# Patient Record
Sex: FEMALE | Race: WHITE | NOT HISPANIC OR LATINO | URBAN - METROPOLITAN AREA
[De-identification: names, ages, dates, MRNs, and addresses within clinical notes are randomized per-mention and may not be internally consistent; named-entity substitution may affect disease eponyms.]

---

## 2017-12-07 ENCOUNTER — EMERGENCY (EMERGENCY)
Facility: HOSPITAL | Age: 62
LOS: 1 days | Discharge: HOME | End: 2017-12-07
Attending: EMERGENCY MEDICINE | Admitting: EMERGENCY MEDICINE
Payer: COMMERCIAL

## 2017-12-07 VITALS
SYSTOLIC BLOOD PRESSURE: 160 MMHG | DIASTOLIC BLOOD PRESSURE: 89 MMHG | TEMPERATURE: 98 F | RESPIRATION RATE: 18 BRPM | OXYGEN SATURATION: 100 % | HEART RATE: 100 BPM

## 2017-12-07 DIAGNOSIS — T78.3XXA ANGIONEUROTIC EDEMA, INITIAL ENCOUNTER: ICD-10-CM

## 2017-12-07 LAB
ALBUMIN SERPL ELPH-MCNC: 4.5 G/DL — SIGNIFICANT CHANGE UP (ref 3.3–5)
ALP SERPL-CCNC: 86 U/L — SIGNIFICANT CHANGE UP (ref 40–120)
ALT FLD-CCNC: 18 U/L — SIGNIFICANT CHANGE UP (ref 4–33)
APTT BLD: 36.5 SEC — SIGNIFICANT CHANGE UP (ref 27.5–37.4)
AST SERPL-CCNC: 21 U/L — SIGNIFICANT CHANGE UP (ref 4–32)
BASE EXCESS BLDV CALC-SCNC: 1.3 MMOL/L — SIGNIFICANT CHANGE UP
BASOPHILS # BLD AUTO: 0.07 K/UL — SIGNIFICANT CHANGE UP (ref 0–0.2)
BASOPHILS NFR BLD AUTO: 0.6 % — SIGNIFICANT CHANGE UP (ref 0–2)
BILIRUB SERPL-MCNC: 0.4 MG/DL — SIGNIFICANT CHANGE UP (ref 0.2–1.2)
BLOOD GAS VENOUS - CREATININE: 0.83 MG/DL — SIGNIFICANT CHANGE UP (ref 0.5–1.3)
BUN SERPL-MCNC: 17 MG/DL — SIGNIFICANT CHANGE UP (ref 7–23)
CALCIUM SERPL-MCNC: 9.5 MG/DL — SIGNIFICANT CHANGE UP (ref 8.4–10.5)
CHLORIDE BLDV-SCNC: 107 MMOL/L — SIGNIFICANT CHANGE UP (ref 96–108)
CHLORIDE SERPL-SCNC: 105 MMOL/L — SIGNIFICANT CHANGE UP (ref 98–107)
CO2 SERPL-SCNC: 25 MMOL/L — SIGNIFICANT CHANGE UP (ref 22–31)
CREAT SERPL-MCNC: 1.06 MG/DL — SIGNIFICANT CHANGE UP (ref 0.5–1.3)
EOSINOPHIL # BLD AUTO: 0.09 K/UL — SIGNIFICANT CHANGE UP (ref 0–0.5)
EOSINOPHIL NFR BLD AUTO: 0.8 % — SIGNIFICANT CHANGE UP (ref 0–6)
GAS PNL BLDV: 140 MMOL/L — SIGNIFICANT CHANGE UP (ref 136–146)
GLUCOSE BLDV-MCNC: 114 — HIGH (ref 70–99)
GLUCOSE SERPL-MCNC: 113 MG/DL — HIGH (ref 70–99)
HBA1C BLD-MCNC: 6.3 % — HIGH (ref 4–5.6)
HCO3 BLDV-SCNC: 24 MMOL/L — SIGNIFICANT CHANGE UP (ref 20–27)
HCT VFR BLD CALC: 45.8 % — HIGH (ref 34.5–45)
HCT VFR BLDV CALC: 46.2 % — HIGH (ref 34.5–45)
HGB BLD-MCNC: 14.7 G/DL — SIGNIFICANT CHANGE UP (ref 11.5–15.5)
HGB BLDV-MCNC: 15.1 G/DL — SIGNIFICANT CHANGE UP (ref 11.5–15.5)
IMM GRANULOCYTES # BLD AUTO: 0.03 # — SIGNIFICANT CHANGE UP
IMM GRANULOCYTES NFR BLD AUTO: 0.3 % — SIGNIFICANT CHANGE UP (ref 0–1.5)
INR BLD: 0.91 — SIGNIFICANT CHANGE UP (ref 0.88–1.17)
LACTATE BLDV-MCNC: 1.5 MMOL/L — SIGNIFICANT CHANGE UP (ref 0.5–2)
LYMPHOCYTES # BLD AUTO: 19.8 % — SIGNIFICANT CHANGE UP (ref 13–44)
LYMPHOCYTES # BLD AUTO: 2.23 K/UL — SIGNIFICANT CHANGE UP (ref 1–3.3)
MCHC RBC-ENTMCNC: 29.9 PG — SIGNIFICANT CHANGE UP (ref 27–34)
MCHC RBC-ENTMCNC: 32.1 % — SIGNIFICANT CHANGE UP (ref 32–36)
MCV RBC AUTO: 93.3 FL — SIGNIFICANT CHANGE UP (ref 80–100)
MONOCYTES # BLD AUTO: 0.76 K/UL — SIGNIFICANT CHANGE UP (ref 0–0.9)
MONOCYTES NFR BLD AUTO: 6.7 % — SIGNIFICANT CHANGE UP (ref 2–14)
NEUTROPHILS # BLD AUTO: 8.1 K/UL — HIGH (ref 1.8–7.4)
NEUTROPHILS NFR BLD AUTO: 71.8 % — SIGNIFICANT CHANGE UP (ref 43–77)
NRBC # FLD: 0 — SIGNIFICANT CHANGE UP
PCO2 BLDV: 49 MMHG — SIGNIFICANT CHANGE UP (ref 41–51)
PH BLDV: 7.35 PH — SIGNIFICANT CHANGE UP (ref 7.32–7.43)
PLATELET # BLD AUTO: 287 K/UL — SIGNIFICANT CHANGE UP (ref 150–400)
PMV BLD: 9.4 FL — SIGNIFICANT CHANGE UP (ref 7–13)
PO2 BLDV: 28 MMHG — LOW (ref 35–40)
POTASSIUM BLDV-SCNC: 4.8 MMOL/L — HIGH (ref 3.4–4.5)
POTASSIUM SERPL-MCNC: 4 MMOL/L — SIGNIFICANT CHANGE UP (ref 3.5–5.3)
POTASSIUM SERPL-SCNC: 4 MMOL/L — SIGNIFICANT CHANGE UP (ref 3.5–5.3)
PROT SERPL-MCNC: 7.5 G/DL — SIGNIFICANT CHANGE UP (ref 6–8.3)
PROTHROM AB SERPL-ACNC: 10.2 SEC — SIGNIFICANT CHANGE UP (ref 9.8–13.1)
RBC # BLD: 4.91 M/UL — SIGNIFICANT CHANGE UP (ref 3.8–5.2)
RBC # FLD: 13.4 % — SIGNIFICANT CHANGE UP (ref 10.3–14.5)
SAO2 % BLDV: 46.5 % — LOW (ref 60–85)
SODIUM SERPL-SCNC: 142 MMOL/L — SIGNIFICANT CHANGE UP (ref 135–145)
WBC # BLD: 11.28 K/UL — HIGH (ref 3.8–10.5)
WBC # FLD AUTO: 11.28 K/UL — HIGH (ref 3.8–10.5)

## 2017-12-07 PROCEDURE — 99220: CPT

## 2017-12-07 PROCEDURE — 31505 DIAGNOSTIC LARYNGOSCOPY: CPT

## 2017-12-07 RX ORDER — IBUPROFEN 200 MG
400 TABLET ORAL ONCE
Qty: 0 | Refills: 0 | Status: COMPLETED | OUTPATIENT
Start: 2017-12-07 | End: 2017-12-07

## 2017-12-07 RX ORDER — FAMOTIDINE 10 MG/ML
20 INJECTION INTRAVENOUS ONCE
Qty: 0 | Refills: 0 | Status: COMPLETED | OUTPATIENT
Start: 2017-12-07 | End: 2017-12-07

## 2017-12-07 RX ORDER — DIPHENHYDRAMINE HCL 50 MG
25 CAPSULE ORAL EVERY 4 HOURS
Qty: 0 | Refills: 0 | Status: DISCONTINUED | OUTPATIENT
Start: 2017-12-07 | End: 2017-12-11

## 2017-12-07 RX ORDER — FAMOTIDINE 10 MG/ML
20 INJECTION INTRAVENOUS
Qty: 0 | Refills: 0 | Status: DISCONTINUED | OUTPATIENT
Start: 2017-12-07 | End: 2017-12-11

## 2017-12-07 RX ORDER — SODIUM CHLORIDE 9 MG/ML
1000 INJECTION INTRAMUSCULAR; INTRAVENOUS; SUBCUTANEOUS
Qty: 0 | Refills: 0 | Status: COMPLETED | OUTPATIENT
Start: 2017-12-07 | End: 2017-12-07

## 2017-12-07 RX ORDER — EPINEPHRINE 0.3 MG/.3ML
0.3 INJECTION INTRAMUSCULAR; SUBCUTANEOUS ONCE
Qty: 0 | Refills: 0 | Status: COMPLETED | OUTPATIENT
Start: 2017-12-07 | End: 2017-12-07

## 2017-12-07 RX ADMIN — FAMOTIDINE 20 MILLIGRAM(S): 10 INJECTION INTRAVENOUS at 19:11

## 2017-12-07 RX ADMIN — Medication 400 MILLIGRAM(S): at 22:16

## 2017-12-07 RX ADMIN — SODIUM CHLORIDE 100 MILLILITER(S): 9 INJECTION INTRAMUSCULAR; INTRAVENOUS; SUBCUTANEOUS at 21:16

## 2017-12-07 RX ADMIN — FAMOTIDINE 20 MILLIGRAM(S): 10 INJECTION INTRAVENOUS at 12:40

## 2017-12-07 RX ADMIN — EPINEPHRINE 0.3 MILLIGRAM(S): 0.3 INJECTION INTRAMUSCULAR; SUBCUTANEOUS at 12:40

## 2017-12-07 RX ADMIN — Medication 400 MILLIGRAM(S): at 21:16

## 2017-12-07 NOTE — ED PROVIDER NOTE - PHYSICAL EXAMINATION
Katerin att: nad, aaox3, neg change in speech,  neg lip swelling, enlarged tongue, unable to assess back of throat, rrr, s1s2, ctabl neg wheeze, abd soft ntnd, neg appreciable urticaria

## 2017-12-07 NOTE — ED CDU PROVIDER INITIAL DAY NOTE - ATTENDING CONTRIBUTION TO CARE
Kelly COREY- 61 Y/O F with h/o htn and multiple allergies p/w sudden osnet of tongue swelling and throat swellign after she rinsed her mouth with new brand of listerine, pt had difficulty speakign and went to urgent care and was sent to ED, given epipnehrine, pepcid, benadryl and steroids, feels better and now is able to speak. Pt was also scoped by ENT which showes no airway obstruction    Pt is alert, well appearing female, s1s2 normal reg, b/l clear breath sounds, abd soft, nt, nd, neuro exam aox3 cn 2-12 intact, skin warm, dry, good turgor, tongue normal in size, one small red, blood contianing vascular lesion noted at base of rt tongue, uvula midline, trachea midline, no neck swelling or redness

## 2017-12-07 NOTE — ED CDU PROVIDER INITIAL DAY NOTE - PROGRESS NOTE DETAILS
pt feel sbetter but still not 100% better, still has sub tongue swelling, will continue monitoring CDU INDU Garcia: Pt signed out to me by CDU INDU Hanna: 62yF w/pmhx HTN on ramipril for 5 years presented with tongue and neck swelling, allergic reaction, following rinsing her mouth with listerine, has never used this product before. Pt is currently in NAD, tachy 103, no difficulty breathing/talking/ or swallowing. Pt is in the CDU for steroids, benadryl, pepcid and monitoring of airway overnight. ENT evaluated this patient and will see them again in the morning. Pt took her home ramipril, asked pt to not take her morning dose as I would like to discuss continuation of this medication with the attending. CDU INDU Garcia: Pt complaining of very mild dull frontal headache, requesting motrin as tylenol does not normally help her.

## 2017-12-07 NOTE — ED PROVIDER NOTE - THROAT FINDINGS
difficult to visualize with tongue swelling - uvulula midline,. no superiof palate swelling.             (after epi - able to visualize back of throat - airway patent no pharyngeal swelling noted)

## 2017-12-07 NOTE — ED PROVIDER NOTE - ATTENDING CONTRIBUTION TO CARE
Dr. Conklin: I performed a face to face bedside interview with patient regarding history of present illness, review of symptoms and past medical history. I completed an independent physical exam.  I have discussed patient's plan of care with PA.   I agree with note as stated above, having amended the EMR as needed to reflect my findings.   This includes HISTORY OF PRESENT ILLNESS, HIV, PAST MEDICAL/SURGICAL/FAMILY/SOCIAL HISTORY, ALLERGIES AND HOME MEDICATIONS, REVIEW OF SYSTEMS, PHYSICAL EXAM, and any PROGRESS NOTES during the time I functioned as the attending physician for this patient. HPI above as by me. PE above as by me. DDX angioedema PLAN epi prn DISPO likely cdu

## 2017-12-07 NOTE — CONSULT NOTE ADULT - PROBLEM SELECTOR RECOMMENDATION 9
1. Laryngoscopy normal. Patient can be discharged after 2 hour observation in ED. No need for a re eval from ENT if patient continues to improve.  2. Follow up with Allergist  3. Discussed with ENT resident.

## 2017-12-07 NOTE — ED ADULT NURSE NOTE - OBJECTIVE STATEMENT
Pt a&ox3 c/o possible allergic reaction to Listerine. Pt unable to speak r/t tongue swelling, no drooling noted, handwriting and using phone to communicate. Pt was able to consume breakfast after using the Listerine, but shortly after breakfast pt started to get hives by neck area and increased swelling to tongue. Pt O2 sat is 100% on RA. Pt denies cp/discomfort, denies headache/dizziness. Abdomen soft, non-tender, non-distended. Skin is cool dry and intact. IVL placed, labs sent. Will continue to monitor.

## 2017-12-07 NOTE — ED PROVIDER NOTE - MEDICAL DECISION MAKING DETAILS
63 y/o female w/o tongue swelling x 1 day  -possible due to new listerine use vs less likely ace-induced angioedema  -epi IM, iv pepcid, labs  -ent consult

## 2017-12-07 NOTE — ED PROVIDER NOTE - OBJECTIVE STATEMENT
61 y/o female hx HTHN (on ramipril 5mg daily x 5 years) presents to E c/o tongue swelling x 1 day. Pt. states at 715 am took some Listerine (new extra strength which she hasn't taken in the past) roughly 20 min later started to develop tongue swelling - took 2 benadryl at 830am - went to urgent care center - was given solumedrol 125mg IM and refused epinephrine - by the time she arrived in ED - upper neck had become more swollen and developed rash to neck and chest. Pt. currently unable to speak due to tongue swelling (writing everything down) but denies any shortness of breath throat swelling or tighness difficulty swallowing. Denies chest pain, fever chills. 63 y/o female hx HTHN (on ramipril 5mg daily x 5 years) presents to E c/o tongue swelling x 1 day. Pt. states at 715 am took some Listerine (new extra strength which she hasn't taken in the past) roughly 20 min later started to develop tongue swelling - took 2 benadryl at 830am - went to urgent care center - was given solumedrol 125mg IM and refused epinephrine - by the time she arrived in ED - upper neck had become more swollen and developed rash to neck and chest. Pt. currently unable to speak due to tongue swelling (writing everything down) but denies any shortness of breath throat swelling or tighness difficulty swallowing. Denies chest pain, fever chills.  13:00 Conklin att: 62F h/o htn on ramipril p/w angioedema x hours. This morning pt gargled new Listerine, shortly thereafter noted tongue swelling, took 2 benadryl. Patient later noted lip swelling. Denies sob, wheeze, change in voice, or difficult swallowing. Reports she has had similar symptoms in 61 y/o female hx HTHN (on ramipril 5mg daily x 5 years) presents to E c/o tongue swelling x 1 day. Pt. states at 715 am took some Listerine (new extra strength which she hasn't taken in the past) roughly 20 min later started to develop tongue swelling - took 2 benadryl at 830am - went to urgent care center - was given solumedrol 125mg IM and refused epinephrine - by the time she arrived in ED - upper neck had become more swollen and developed rash to neck and chest. Pt. currently unable to speak due to tongue swelling (writing everything down) but denies any shortness of breath throat swelling or tighness difficulty swallowing. Denies chest pain, fever chills.  13:00 Conklin att: 62F h/o htn on ramipril p/w angioedema x hours. This morning pt gargled new Listerine, shortly thereafter noted tongue swelling, took 2 benadryl at 08:30am. No improvement noted. Denies sob, wheeze, change in voice, or difficult swallowing. Reports she has had similar symptoms twice before in past.

## 2017-12-07 NOTE — ED CDU PROVIDER INITIAL DAY NOTE - OBJECTIVE STATEMENT
63 y/o female hx HTHN (on ramipril 5mg daily x 5 years) presents to E c/o tongue swelling x 1 day. Pt. states at 715 am took some Listerine (new extra strength which she hasn't taken in the past) roughly 20 min later started to develop tongue swelling - took 2 benadryl at 830am - went to urgent care center - was given solumedrol 125mg IM and refused epinephrine - by the time she arrived in ED - upper neck had become more swollen and developed rash to neck and chest. Pt. currently unable to speak due to tongue swelling (writing everything down) but denies any shortness of breath throat swelling or tightness difficulty swallowing. Denies chest pain, fever chills.    CDU INDU Welch: Agree with above. 62 yr old female with hx of HTN ( on ramipril 5mg daily x 5 years) presents c/o tongue swelling since this morning. Pt reports at 715 am took some listerine ( new extra strength, which is new to her) and then about 20 mins later tongue started to swell. Pt took 2 benadryl at about 830 am, then went to urgent care- was given solumedrol 125 mg IM and sent to ED for further eval.  When pt arrived to ED, swelling worsened, and pt developed rash to chest and neck. Pt was unable to speak due to tongue swelling  Pt was given epi in ED. ENT seen pt- scope was done showing no airway obstruction. Pt in cdu, for airway observation, benadryl/ pepcid/ steriods, and ENT to follow.  Pt denies any current sob or difficulty breathing.

## 2017-12-07 NOTE — ED ADULT TRIAGE NOTE - CHIEF COMPLAINT QUOTE
Pt c/o possible allergic reaction to something this morning, pt used Listerine this morning, Pt was able to eat breakfast, shortly after breakfast pt had swelling to her tongue and tightness to throat. Pt brought back directly to [facility].

## 2017-12-07 NOTE — CONSULT NOTE ADULT - SUBJECTIVE AND OBJECTIVE BOX
62 year old female with a history of allergies to foods and products presents to the   hospital stating she was rinsing with Listorine and she then began to have tongue swelling.   Patient also takes ACE inhibitor. States she was unable to speak and it improved after getting  Epi pen. Denies any SOB, Dysphagia, Hoarseness or any other complaints.    AVSS, P02 98% on room air  HEENT:  Mouth: No FOM edema, No exudates, No Injection  tongue midline.  FOE/Scope:  No BOT edema, No Masses, Epiglottis sharp  Vocal cords mobile/patent

## 2017-12-07 NOTE — ED PROVIDER NOTE - PROGRESS NOTE DETAILS
Katerin att: Patient noted to have tongue swelling, received iv benadryl, pepcid, decadron. One to two hours later patient noted to have new left side lip swelling, neg reported sob or throat tightness. Patient written for epi x 1. One hour later patient noted to have bilateral lip swelling. Patient written for second epi. Patient noted to have persistent unchanged tongue and lip swelling w/o dyspnea. Upon further history taking, pt took atenolol this am. 16:00 ENT paged to scope upper airway, cdu dispo pending ENT scope, pt signed out to Mk attg Dr. Hale.

## 2017-12-08 VITALS
OXYGEN SATURATION: 95 % | TEMPERATURE: 98 F | HEART RATE: 91 BPM | DIASTOLIC BLOOD PRESSURE: 98 MMHG | SYSTOLIC BLOOD PRESSURE: 151 MMHG | RESPIRATION RATE: 14 BRPM

## 2017-12-08 PROCEDURE — 99217: CPT

## 2017-12-08 RX ORDER — EPINEPHRINE 0.3 MG/.3ML
0.3 INJECTION INTRAMUSCULAR; SUBCUTANEOUS
Qty: 1 | Refills: 0 | OUTPATIENT
Start: 2017-12-08

## 2017-12-08 RX ADMIN — FAMOTIDINE 20 MILLIGRAM(S): 10 INJECTION INTRAVENOUS at 06:28

## 2017-12-08 RX ADMIN — Medication 60 MILLIGRAM(S): at 08:58

## 2017-12-08 NOTE — ED CDU PROVIDER DISPOSITION NOTE - CLINICAL COURSE
Pt doing well, throat swelling markedly improved, no uvular edema, CTAB. Will d/w PMD, hold ACEi (however pt took ramipiril last night with no effects). Pt to be dc'ed home on prednisone, pepcid and benadryl and pt has allergist to f/u with. Pt stable for dc. Dr. Christensen: 62F h/o allergies p/w throat swelling and tongue swelling yesterday 45 mins s/p listerine (new formulation). Pt is also on ramipiril. Pt recd epi and steroids in the ED. Today pt is doing well, throat swelling markedly improved, no uvular edema, CTAB. Will d/w PMD, hold ACEi (however pt took ramipiril last night with no effects). Pt to be dc'ed home on prednisone, pepcid and benadryl and pt has allergist to f/u with. Pt stable for dc. Dr. Christensen: 62F h/o allergies p/w throat swelling and tongue swelling yesterday 45 mins s/p listerine (new formulation). Pt is also on ramipiril. Pt recd epi and steroids in the ED. Today pt is doing well, throat swelling markedly improved, no uvular edema, CTAB. Will d/w PMD, hold ACEi (however pt took ramipiril last night with no effects). Pt to be dc'ed home on prednisone, pepcid and benadryl and pt has allergist to f/u with. Pt stable for dc.    INDU Condon- Pt feeling better after CDU stay, swelling significantly improved, tolerating PO, no voice changes. stable for dc w/ close allergy and pmd f/u.

## 2017-12-08 NOTE — ED CDU PROVIDER SUBSEQUENT DAY NOTE - HISTORY
CDU INITIAL DAY HPI: "62 yr old female with hx of HTN ( on ramipril 5mg daily x 5 years) presents c/o tongue swelling since this morning. Pt reports at 715 am took some listerine ( new extra strength, which is new to her) and then about 20 mins later tongue started to swell. Pt took 2 benadryl at about 830 am, then went to urgent care- was given solumedrol 125 mg IM and sent to ED for further eval.  When pt arrived to ED, swelling worsened, and pt developed rash to chest and neck. Pt was unable to speak due to tongue swelling  Pt was given epi in ED. ENT seen pt- scope was done showing no airway obstruction. Pt in cdu, for airway observation, benadryl/ pepcid/ steriods, and ENT to follow.  Pt denies any current sob or difficulty breathing."    Pt signed out to me by INDU Welch: 62yF w/pmhx HTN on ramipril presented to the ED with tongue and neck swelling following using listerine for the first time. Pt was given IV steroids by EMS and epi upon arrival to the ED. ENT evaluated the patient and noted no throat swelling. Pt denies difficulty breathing, swallowing or speaking.  In CDU for monitoring and ENT to see in the AM

## 2017-12-08 NOTE — ED CDU PROVIDER SUBSEQUENT DAY NOTE - ATTENDING CONTRIBUTION TO CARE
Dr. Christensen: I performed a face to face bedside interview with patient regarding history of present illness, review of symptoms and past medical history. I completed an independent physical exam.  I have discussed patient's plan of care with PA.   I agree with note as stated above, having amended the EMR as needed to reflect my findings.   This includes HISTORY OF PRESENT ILLNESS, HIV, PAST MEDICAL/SURGICAL/FAMILY/SOCIAL HISTORY, ALLERGIES AND HOME MEDICATIONS, REVIEW OF SYSTEMS, PHYSICAL EXAM, and any PROGRESS NOTES during the time I functioned as the attending physician for this patient.  62F h/o allergies p/w throat swelling and tongue swelling yesterday 45 mins s/p listerine (new formulation). Pt is also on ramipiril. Pt recd epi and steroids in the ED. Pt doing well, throat swelling markedly improved, no uvular edema, CTAB. Will d/w PMD, hold ACEi (however pt took ramipiril last night with no effects). Pt to be dc'ed home on prednisone, pepcid and benadryl and pt has allergist to f/u with.

## 2017-12-08 NOTE — ED CDU PROVIDER DISPOSITION NOTE - ATTENDING CONTRIBUTION TO CARE
Dr. Christensen: I performed a face to face bedside interview with patient regarding history of present illness, review of symptoms and past medical history. I completed an independent physical exam.  I have discussed patient's plan of care with PA.   I agree with note as stated above, having amended the EMR as needed to reflect my findings.   This includes HISTORY OF PRESENT ILLNESS, HIV, PAST MEDICAL/SURGICAL/FAMILY/SOCIAL HISTORY, ALLERGIES AND HOME MEDICATIONS, REVIEW OF SYSTEMS, PHYSICAL EXAM, and any PROGRESS NOTES during the time I functioned as the attending physician for this patient.  Dr. Christensen: This H&P has been written by myself in its entirety

## 2017-12-08 NOTE — ED CDU PROVIDER SUBSEQUENT DAY NOTE - PROGRESS NOTE DETAILS
CDU INDU Garcia: Pt resting comfortably in bed, NAD, VSS, no complaints overnight, no longer tachycardic following 1L NS CDU INDU Garcia: Pt resting comfortably in bed, NAD, VSS, no complaints overnight, pt still feels her neck is swollen but much better than yesterday, no longer tachycardic following 1L NS Pt doing well, throat swelling markedly improved, no uvular edema, CTAB. Will d/w PMD, hold ACEi (however pt took ramipiril last night with no effects). Pt to be dc'ed home on prednisone, pepcid and benadryl and pt has allergist to f/u with. Pt feeling better after ER say, swelling significantly imiproved, tolerating PO, no voice changes. WIll dc ramapril. Pt to have close f/u with PMD. stable for dc.

## 2017-12-08 NOTE — ED CDU PROVIDER SUBSEQUENT DAY NOTE - PROGRESS NOTE
I have reviewed and confirmed nurses' notes for patient's medications, allergies, medical history, and surgical history.
Stable.

## 2017-12-08 NOTE — ED CDU PROVIDER SUBSEQUENT DAY NOTE - MEDICAL DECISION MAKING DETAILS
62yF w/pmhx HTN on ramipril for 5 years presented with allergic reaction that began 45 minutes after using Listerine for the first time. ENT evaluated pt, given IV steroids by EMS and epi in the ED. Pt is CDU for monitoring and possible re-eval by ENT.

## 2018-01-14 ENCOUNTER — EMERGENCY (EMERGENCY)
Facility: HOSPITAL | Age: 63
LOS: 1 days | Discharge: HOME | End: 2018-01-14
Attending: EMERGENCY MEDICINE | Admitting: EMERGENCY MEDICINE
Payer: COMMERCIAL

## 2018-01-14 VITALS
HEART RATE: 91 BPM | OXYGEN SATURATION: 99 % | TEMPERATURE: 98 F | DIASTOLIC BLOOD PRESSURE: 86 MMHG | RESPIRATION RATE: 18 BRPM | SYSTOLIC BLOOD PRESSURE: 151 MMHG

## 2018-01-14 VITALS
RESPIRATION RATE: 18 BRPM | TEMPERATURE: 99 F | OXYGEN SATURATION: 99 % | HEART RATE: 87 BPM | DIASTOLIC BLOOD PRESSURE: 97 MMHG | SYSTOLIC BLOOD PRESSURE: 159 MMHG

## 2018-01-14 LAB
ALBUMIN SERPL ELPH-MCNC: 4.4 G/DL — SIGNIFICANT CHANGE UP (ref 3.3–5)
ALP SERPL-CCNC: 89 U/L — SIGNIFICANT CHANGE UP (ref 40–120)
ALT FLD-CCNC: 17 U/L — SIGNIFICANT CHANGE UP (ref 4–33)
AST SERPL-CCNC: 36 U/L — HIGH (ref 4–32)
BASOPHILS # BLD AUTO: 0.1 K/UL — SIGNIFICANT CHANGE UP (ref 0–0.2)
BASOPHILS NFR BLD AUTO: 1 % — SIGNIFICANT CHANGE UP (ref 0–2)
BILIRUB SERPL-MCNC: 0.2 MG/DL — SIGNIFICANT CHANGE UP (ref 0.2–1.2)
BUN SERPL-MCNC: 20 MG/DL — SIGNIFICANT CHANGE UP (ref 7–23)
CALCIUM SERPL-MCNC: 8.9 MG/DL — SIGNIFICANT CHANGE UP (ref 8.4–10.5)
CHLORIDE SERPL-SCNC: 106 MMOL/L — SIGNIFICANT CHANGE UP (ref 98–107)
CO2 SERPL-SCNC: 21 MMOL/L — LOW (ref 22–31)
CREAT SERPL-MCNC: 0.88 MG/DL — SIGNIFICANT CHANGE UP (ref 0.5–1.3)
EOSINOPHIL # BLD AUTO: 0.13 K/UL — SIGNIFICANT CHANGE UP (ref 0–0.5)
EOSINOPHIL NFR BLD AUTO: 1.3 % — SIGNIFICANT CHANGE UP (ref 0–6)
GLUCOSE SERPL-MCNC: 100 MG/DL — HIGH (ref 70–99)
HCT VFR BLD CALC: 46.5 % — HIGH (ref 34.5–45)
HGB BLD-MCNC: 15.1 G/DL — SIGNIFICANT CHANGE UP (ref 11.5–15.5)
IMM GRANULOCYTES # BLD AUTO: 0.04 # — SIGNIFICANT CHANGE UP
IMM GRANULOCYTES NFR BLD AUTO: 0.4 % — SIGNIFICANT CHANGE UP (ref 0–1.5)
LYMPHOCYTES # BLD AUTO: 2.43 K/UL — SIGNIFICANT CHANGE UP (ref 1–3.3)
LYMPHOCYTES # BLD AUTO: 24.3 % — SIGNIFICANT CHANGE UP (ref 13–44)
MCHC RBC-ENTMCNC: 30.8 PG — SIGNIFICANT CHANGE UP (ref 27–34)
MCHC RBC-ENTMCNC: 32.5 % — SIGNIFICANT CHANGE UP (ref 32–36)
MCV RBC AUTO: 94.9 FL — SIGNIFICANT CHANGE UP (ref 80–100)
MONOCYTES # BLD AUTO: 0.73 K/UL — SIGNIFICANT CHANGE UP (ref 0–0.9)
MONOCYTES NFR BLD AUTO: 7.3 % — SIGNIFICANT CHANGE UP (ref 2–14)
NEUTROPHILS # BLD AUTO: 6.57 K/UL — SIGNIFICANT CHANGE UP (ref 1.8–7.4)
NEUTROPHILS NFR BLD AUTO: 65.7 % — SIGNIFICANT CHANGE UP (ref 43–77)
NRBC # FLD: 0 — SIGNIFICANT CHANGE UP
PLATELET # BLD AUTO: 271 K/UL — SIGNIFICANT CHANGE UP (ref 150–400)
PMV BLD: 9.4 FL — SIGNIFICANT CHANGE UP (ref 7–13)
POTASSIUM SERPL-MCNC: SIGNIFICANT CHANGE UP MMOL/L (ref 3.5–5.3)
POTASSIUM SERPL-SCNC: SIGNIFICANT CHANGE UP MMOL/L (ref 3.5–5.3)
PROT SERPL-MCNC: 7.9 G/DL — SIGNIFICANT CHANGE UP (ref 6–8.3)
RBC # BLD: 4.9 M/UL — SIGNIFICANT CHANGE UP (ref 3.8–5.2)
RBC # FLD: 13.2 % — SIGNIFICANT CHANGE UP (ref 10.3–14.5)
SODIUM SERPL-SCNC: 140 MMOL/L — SIGNIFICANT CHANGE UP (ref 135–145)
WBC # BLD: 10 K/UL — SIGNIFICANT CHANGE UP (ref 3.8–10.5)
WBC # FLD AUTO: 10 K/UL — SIGNIFICANT CHANGE UP (ref 3.8–10.5)

## 2018-01-14 PROCEDURE — 99284 EMERGENCY DEPT VISIT MOD MDM: CPT

## 2018-01-14 RX ORDER — FAMOTIDINE 10 MG/ML
20 INJECTION INTRAVENOUS ONCE
Qty: 0 | Refills: 0 | Status: COMPLETED | OUTPATIENT
Start: 2018-01-14 | End: 2018-01-14

## 2018-01-14 RX ADMIN — FAMOTIDINE 20 MILLIGRAM(S): 10 INJECTION INTRAVENOUS at 09:29

## 2018-01-14 RX ADMIN — Medication 125 MILLIGRAM(S): at 09:29

## 2018-01-14 NOTE — ED PROVIDER NOTE - CARE PLAN
Principal Discharge DX:	Allergic reaction  Instructions for follow-up, activity and diet:	Follow-up with your Primary Care Physician within 24-48 hours.  Please return to the Emergency Department immediately for any new, worsening or concerning symptoms; specifically those included in the attached information brochure.     You were seen in the Emergency Department for concerns about an allergic reaction.  Please discontinue taking your Amlodipine at this time and follow-up with your primary care physician and allergist for further management and direction.  A referral list for allergist has been provided along with your discharge paperwork.    If you begin to develop increased tongue swelling, shortness of breath, diffuse rash with itching and/or any concerning symptoms - please return to the Emergency Department for further evaluation.

## 2018-01-14 NOTE — ED PROVIDER NOTE - PLAN OF CARE
Follow-up with your Primary Care Physician within 24-48 hours.  Please return to the Emergency Department immediately for any new, worsening or concerning symptoms; specifically those included in the attached information brochure.     You were seen in the Emergency Department for concerns about an allergic reaction.  Please discontinue taking your Amlodipine at this time and follow-up with your primary care physician and allergist for further management and direction.  A referral list for allergist has been provided along with your discharge paperwork.    If you begin to develop increased tongue swelling, shortness of breath, diffuse rash with itching and/or any concerning symptoms - please return to the Emergency Department for further evaluation.

## 2018-01-14 NOTE — ED ADULT NURSE NOTE - OBJECTIVE STATEMENT
Pt received in [facility]. Alert and oriented x3, ambulatory. Pt on amlodipine for htn for past 3 weeks. Was on Ramipril but taken off due to an allergic reaction causing throat swelling. Pt received in [facility]. Alert and oriented x3, ambulatory. Pt on amlodipine for htn for past 3 weeks. Was on Ramipril x 5 years prior to that but taken off due to an allergic reaction causing tongue swelling. Given epi in ED. Today, pt began having swelling to left side of tongue. No rash, hives or redness noted. Denies any co pain. Airway visible. States she has allergies to shellfish. Was at a party yesterday and ate nuts which she normally does not eat. Labs sent. 22g to right AC placed. VS stable. NSR on cm. Medications given as per MD orders. Comfort measures provided. Will continue to monitor.

## 2018-01-14 NOTE — ED PROVIDER NOTE - ENMT, MLM
Airway patent, Nasal mucosa clear. Mouth with normal mucosa. Throat has no vesicles, no oropharyngeal exudates and uvula is midline.  Very mild swelling of the tongue; post pharynx clear

## 2018-01-14 NOTE — ED PROVIDER NOTE - OBJECTIVE STATEMENT
62 year-old female with history of angioedema (recently seen for similar c/o in Dec) presents to the Emergency Department for tongue swelling.  Patient mentions she woke up this AM and noticed generalized tongue swelling at 7 AM.  No complaints of fevers, chills, nausea, vomiting, chest pain, shortness of breath, rash.  Took 50mg of Benadryl and came to the ED for evaluation.  She was seen in Dec for similar, but worse, symptoms and tongue swelling likely secondary to her ramipril.  She has since d/c her Ramipril and has been on Amlodipine for the past 3 months.  Reports eating mixed nuts last night.  No other new items/foods/medications reported.

## 2018-01-14 NOTE — ED ADULT TRIAGE NOTE - CHIEF COMPLAINT QUOTE
states" I am having an allergic reaction to some thing since today" swollen tongue noted. started to take Amlodipine about 3 weeks ago for HTN

## 2018-01-14 NOTE — ED PROVIDER NOTE - MEDICAL DECISION MAKING DETAILS
62 year-old female with history of angioedema (recently seen for similar c/o in Dec) presents to the Emergency Department for tongue swelling - no s/s of airway compromise at this time - speaking in full sentences and tolerating her secretions.  Plan to give patient some steroids and pepcid and reassess.  Plan to d/c with allergy f/u if no acute concerns arise.

## 2018-03-10 ENCOUNTER — EMERGENCY (EMERGENCY)
Facility: HOSPITAL | Age: 63
LOS: 1 days | Discharge: HOME | End: 2018-03-10
Attending: EMERGENCY MEDICINE | Admitting: EMERGENCY MEDICINE
Payer: COMMERCIAL

## 2018-03-10 VITALS
RESPIRATION RATE: 18 BRPM | OXYGEN SATURATION: 98 % | HEART RATE: 95 BPM | SYSTOLIC BLOOD PRESSURE: 151 MMHG | DIASTOLIC BLOOD PRESSURE: 96 MMHG

## 2018-03-10 VITALS
HEART RATE: 88 BPM | RESPIRATION RATE: 18 BRPM | SYSTOLIC BLOOD PRESSURE: 158 MMHG | TEMPERATURE: 98 F | OXYGEN SATURATION: 100 % | DIASTOLIC BLOOD PRESSURE: 81 MMHG

## 2018-03-10 PROCEDURE — 99284 EMERGENCY DEPT VISIT MOD MDM: CPT | Mod: 25

## 2018-03-10 RX ORDER — FAMOTIDINE 10 MG/ML
1 INJECTION INTRAVENOUS
Qty: 10 | Refills: 0 | OUTPATIENT
Start: 2018-03-10 | End: 2018-03-14

## 2018-03-10 RX ORDER — DEXAMETHASONE 0.5 MG/5ML
10 ELIXIR ORAL ONCE
Qty: 0 | Refills: 0 | Status: COMPLETED | OUTPATIENT
Start: 2018-03-10 | End: 2018-03-10

## 2018-03-10 RX ORDER — DIPHENHYDRAMINE HCL 50 MG
1 CAPSULE ORAL
Qty: 30 | Refills: 0 | OUTPATIENT
Start: 2018-03-10

## 2018-03-10 RX ADMIN — Medication 102 MILLIGRAM(S): at 02:06

## 2018-03-10 NOTE — ED PROVIDER NOTE - MEDICAL DECISION MAKING DETAILS
suspect allergic rxn less likely angioedema. will treat as allergic. already received benadryl and zantac PTA. will give steroids, observe, and reassess. no indication for epi at this tie. suspect allergic rxn less likely angioedema. will treat as allergic. already received benadryl and zantac PTA. will give steroids, observe, and reassess. no indication for epi at this time.

## 2018-03-10 NOTE — ED ADULT NURSE NOTE - OBJECTIVE STATEMENT
pt with hx of angioedema received to the ed came in c/o tongue swelling that began at around 11pm today. pt is a&ox4 and ambulatory at baseline. Pt skin intact, no hives noted. Pt tongue swollen and slight difficulty speaking noted. Pt reports that swelling feels like it is "decreasing now". Pt denies difficulty swallowing and breathing. Pt denies swelling to throat, and denies any itchiness or rash. Pt reports that she has not eaten anything differently that would cause her to have an allergic reaction. Pt has a 20 gauge placed in the rt ac, medicated as per ordered. Pt placed on cardiac monitor in nsr, oxygenating at 98% room air. Pt  at bedside, pt in nad, pulse oximeter in place for continuous monitoring. will continue to monitor.

## 2018-03-10 NOTE — ED PROVIDER NOTE - PROGRESS NOTE DETAILS
Pt states feeling better. Swelling resolving. Tolerating PO. Pt notes she still has her epi pen from last visit. Sent Pepcid, Benadryl, and Pepcid to pharmacy.

## 2018-03-10 NOTE — ED ADULT TRIAGE NOTE - CHIEF COMPLAINT QUOTE
Pt C/O Tongue swelling since dinner at 11pm. Pt allergic to seafood, fish, iodine, however did not eat anything she has known allergies to. pt took 50 mg Benadryl and 75mg zantac at approximately 11:30 pm; believes tongue swelling has increased since. Pt observed to have difficulty speaking, and hives to neck. Pt denies SOB, dyspnea, swelling to throat, itching. Airway patent, respirations even and unlabored. PMH- Angioedema, has been off Ramipril for 6 weeks.

## 2018-03-10 NOTE — ED PROVIDER NOTE - OBJECTIVE STATEMENT
Sheila Peck M.D: 62yoF hx angioedema, allergic to shellfish, p/w acute onset tongue swelling. started at 11pm. took benadryl and zantac which have not relieved symptoms. no sob no wheezing no difficulty speaking or swallowing no rashes no n/v/d. ate out tonight at a restaurant but does not believe she was exposed to allergen. of ntoe pt has been off ramipril xmonths. Sheila Peck M.D: 62yoF hx angioedema, allergic to shellfish, p/w acute onset tongue swelling. started at 11pm. took benadryl and zantac which have not relieved symptoms. no sob no wheezing no difficulty speaking or swallowing no rashes no n/v/d. ate out tonight at a restaurant but does not believe she was exposed to allergen. Of note pt has been off ramipril x 3 months. Pt does note she started Norvasc 5 wks ago.

## 2018-03-10 NOTE — ED PROVIDER NOTE - PHYSICAL EXAMINATION
Sheila Peck M.D.:   patient awake alert NAD .   LUNGS CTAB no wheeze no crackle.   CARD RRR no m/r/g.    Abdomen soft NT ND no rebound no guarding no CVA tenderness.   EXT WWP no edema no calf tenderness CV 2+DP/PT bilaterally.   neuro A&Ox3 gait normal.    skin warm and dry no rash  HEENT: moist mucous membranes, PERRL, EOMI asymmetric tongue swelling without uvular or lip involvement.

## 2018-03-10 NOTE — ED PROVIDER NOTE - ATTENDING CONTRIBUTION TO CARE
Pt was seen and evaluated by me. Pt states having a history of angioedema which was believed secondary to Ramapril. Pt states she has been off Ramapril for the past 3 months and has been on Norvasc for the past 5 weeks. Pt states around 2 hours after dinner having some tongue swelling. Pt states she took Zantac and Benadryl PTA. Pt denies any difficulty swallowing or SOB. Denies any fever, chills, SOB, chest pain, nausea, vomiting, or abd pain. Lungs CTA b/l. RRR. Abd soft, non-tender. Uvula midline. Mild tongue swelling.

## 2018-10-19 ENCOUNTER — EMERGENCY (EMERGENCY)
Facility: HOSPITAL | Age: 63
LOS: 1 days | Discharge: HOME | End: 2018-10-19
Attending: EMERGENCY MEDICINE | Admitting: EMERGENCY MEDICINE
Payer: MEDICARE

## 2018-10-19 VITALS
SYSTOLIC BLOOD PRESSURE: 150 MMHG | OXYGEN SATURATION: 99 % | TEMPERATURE: 98 F | DIASTOLIC BLOOD PRESSURE: 90 MMHG | HEART RATE: 93 BPM | RESPIRATION RATE: 18 BRPM

## 2018-10-19 PROCEDURE — 99218: CPT | Mod: GC

## 2018-10-19 RX ORDER — DIPHENHYDRAMINE HCL 50 MG
50 CAPSULE ORAL EVERY 4 HOURS
Qty: 0 | Refills: 0 | Status: DISCONTINUED | OUTPATIENT
Start: 2018-10-19 | End: 2018-10-23

## 2018-10-19 RX ORDER — DIPHENHYDRAMINE HCL 50 MG
50 CAPSULE ORAL ONCE
Qty: 0 | Refills: 0 | Status: COMPLETED | OUTPATIENT
Start: 2018-10-19 | End: 2018-10-19

## 2018-10-19 RX ADMIN — Medication 50 MILLIGRAM(S): at 22:18

## 2018-10-19 RX ADMIN — Medication 50 MILLIGRAM(S): at 23:55

## 2018-10-19 NOTE — ED CDU PROVIDER INITIAL DAY NOTE - THROAT FINDINGS
Mild tongue swelling L > R; handling airway and secretions appropriately. No uvular swelling/no exudate/NO DROOLING/NO STRIDOR/uvula midline/no redness

## 2018-10-19 NOTE — ED ADULT NURSE NOTE - OBJECTIVE STATEMENT
Pt rcvd to 26 c/o tongue swelling and +speech change, w/ history of angioedema reactions in the past.  Pt reports had pecan dessert tonight, no hx diagnosed allergens.  Is pending results from allergy testing.  PT denies use of ace inhibitors.  Rpts hx HTN - but off medication for BP 2/2 this reaction in the past.  Pt vitally stable, aaox4, resps even/unlabored on RA. Denies hives/facial swelling/lip swelling or difficulty swallowing.  Reports tongue swelling and "lisp" since ~730pm.  Took 2benadryl, zyrtec and pepcid immediately then came to ED. Has Epi-pen but denies use.  Pt denies throat irritation or airway tightening.  IV placed to L Hand #20g.  No labs indicated at this time per ED MD Bryant @ bedside, awaiting further orders/plan of care.  Will monitor.

## 2018-10-19 NOTE — ED CDU PROVIDER INITIAL DAY NOTE - OBJECTIVE STATEMENT
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 Denies being on any medications (taken off all BP meds due to recurrence of episodes in past). Pt denies any unique exposures today, she reports eating cheesecake w/ walnuts on it but no hx of allergies to nuts in past. Pt took benadryl, pepcid, zyrtec, and famotidine when she first began experiencing symptoms, prior to coming to the ED. Currently she feels that her tongue swelling is improving; she has no SOB, no stridor, no wheeze, no rash, no abd pain. There is no visible lip or cheek involvement.    CDU PA Alberto: 63 year old female, PMHx of angioedema, HTN (not on meds dt swelling); presents to the ED with tongue swelling since this evening. Patient states she has had multiple episodes of similar, they began approximately one year ago while on an ACE inhibitor and she presented with large perioral, intraoral, neck swelling. They d/c her meds, but she has since had several other episodes. She states she has seen an allergist multiple times with negative w/u's and has had no new exposures. She endorses that tonight as well as twice in the past two weeks she was at the same restaurant but ordered different food each time. She asked for ingredients but could not identify one in which she would be allergic to. She states she felt her tongue swelling so she took zyrtec, benadryl and pepcid PTA and has had gradual improvement of symptoms. Pt received prednisone in the ED and is brought to cdu for obs, prn benadryl, am steroids. Pt denies difficulty breathing, swallowing, rash or other complaints. Pt has epipen on her, did not use it.

## 2018-10-19 NOTE — ED CDU PROVIDER INITIAL DAY NOTE - ATTENDING CONTRIBUTION TO CARE
Dr Bryant Attending note:   After face to face evaluation of this patient, I concur with above noted hx, pe, and care plan for this patient.  68 y/o F with recurrent angioedema, again today with tongue swelling with no clear etiology.  PAtient self medicated at home with benadryl, pepcid; after steroid utilization, patient will be observed overnight.

## 2018-10-19 NOTE — ED PROVIDER NOTE - OBJECTIVE STATEMENT
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 Denies being on any medications (taken off all BP meds due to recurrence of episodes in past). Pt denies any unique exposures today, she reports eating cheesecake w/ walnuts on it but no hx of allergies to nuts in past. Pt took benadryl, pepcid, zyrtec, and famotidine when she first began experiencing symptoms, prior to coming to the ED. Currently she feels that her tongue swelling is improving; she has no SOB, no stridor, no wheeze, no rash, no abd pain. There is no visible lip or cheek involvment. 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 Denies being on any medications (taken off all BP meds due to recurrence of episodes in past). Pt denies any unique exposures today, she reports eating cheesecake w/ walnuts on it but no hx of allergies to nuts in past. Pt took benadryl, pepcid, zyrtec, and famotidine when she first began experiencing symptoms, prior to coming to the ED. Currently she feels that her tongue swelling is improving; she has no SOB, no stridor, no wheeze, no rash, no abd pain. There is no visible lip or cheek involvment..

## 2018-10-19 NOTE — ED ADULT TRIAGE NOTE - CHIEF COMPLAINT QUOTE
c/o tongue swelling x 1/2 hr. Pt has hx angioedema and states feels like its starting again. This time what was different is that pt states had pecans today. Pt took Zyrtec, Famotidine 20 mg , Benadryl 2 tabs at 1930. Does not feel any improvement. Left side of tongue appears swollen. Speech clear.

## 2018-10-19 NOTE — ED CDU PROVIDER INITIAL DAY NOTE - MEDICAL DECISION MAKING DETAILS
62 yo f pw angioedema of unknown origin- possibly allergic, discussed w/patient when to use epipen and allergy testing- will give additional dose of benadryl as she last took it at 1930, benadryl q 4 prn, steroids in am.

## 2018-10-19 NOTE — ED CDU PROVIDER INITIAL DAY NOTE - CHPI ED SYMPTOMS NEG
no difficulty swallowing/no throat itching/no difficulty breathing/no wheezing/no vomiting/no cough/no shortness of breath/no nausea/no rash

## 2018-10-19 NOTE — ED PROVIDER NOTE - ATTENDING CONTRIBUTION TO CARE
Attending note:   After face to face evaluation of this patient, I concur with above noted hx, pe, and care plan for this patient.  62 y/o F with chronic recurrent tongue angioedema, again present today with patient having taken benadryl and pepcid prior to ed arrival.   +Mild tongue enlargement.   Plan: steroids, observe

## 2018-10-20 VITALS
RESPIRATION RATE: 16 BRPM | OXYGEN SATURATION: 100 % | TEMPERATURE: 98 F | HEART RATE: 100 BPM | DIASTOLIC BLOOD PRESSURE: 105 MMHG | SYSTOLIC BLOOD PRESSURE: 159 MMHG

## 2018-10-20 PROBLEM — I10 ESSENTIAL (PRIMARY) HYPERTENSION: Chronic | Status: ACTIVE | Noted: 2017-12-07

## 2018-10-20 PROCEDURE — 99217: CPT | Mod: GC

## 2018-10-20 RX ORDER — DIPHENHYDRAMINE HCL 50 MG
1 CAPSULE ORAL
Qty: 120 | Refills: 0 | OUTPATIENT
Start: 2018-10-20 | End: 2018-11-18

## 2018-10-20 RX ORDER — FAMOTIDINE 10 MG/ML
1 INJECTION INTRAVENOUS
Qty: 60 | Refills: 0 | OUTPATIENT
Start: 2018-10-20 | End: 2018-11-18

## 2018-10-20 NOTE — ED CDU PROVIDER SUBSEQUENT DAY NOTE - HISTORY
Patient is a 63 year old female who states she had developed tongue swelling and shortness of breath. Patient states she had a piece of pecan pie and is not sure if that is what triggered the response. Patient came to the ER for evaluation.

## 2018-10-20 NOTE — ED CDU PROVIDER DISPOSITION NOTE - NSFOLLOWUPINSTRUCTIONS_ED_ALL_ED_FT
Follow up with your PMD within 2-3 days. Also follow up with allergist Dr Eran Raza within 2-3 days. Call for appointment. Bring copies of your ER lab reports with you to MD appointment. Take pepcid 20mg twice daily. Take prednisone 50 mg once daily until finished. Take Benadryl 25mg every 6 hours as needed for itch. This medication may cause drowsiness so driving ir operating equipment is not recommended. Return to ER if symptoms return or worsen or if new concerns arise.

## 2018-10-20 NOTE — ED CDU PROVIDER SUBSEQUENT DAY NOTE - ATTENDING CONTRIBUTION TO CARE
Attending Statement: I have reviewed and agree with all pertinent clinical information, including history and physical exam and agree with treatment plan of the PA, except as noted.  64yo F presented to ED co tongue swelling and shortness of breath after eating a piece of pecan pie and cheese cake. Unclear if that caused allergic reaction. Pt denies any new meds/lotions/ detergents. Unclear etiology of the cause. Pt had labs with allergist in the past.  Overnight pt feeling better. Denies any sob/cp/no change in voice. no rash not itchy   vss mmm. no drooling. midline uvula. No resp distress. able to speak in full and clear sentences. no wheeze, rales or stridor. normal S1-S2 no rash. no hives.  plan dc home w prednisone, benadryl prn pepcid. outpt allergy follow up. pt would like to go home. will have  pick her up.

## 2018-10-20 NOTE — ED CDU PROVIDER DISPOSITION NOTE - CARE PROVIDER_API CALL
Eran Raza), Allergy and Immunology; Internal Medicine; Pulmonary Disease  [address] [place] [address]  Phone: [phone]  Fax: [phone]

## 2018-10-20 NOTE — ED CDU PROVIDER SUBSEQUENT DAY NOTE - PROGRESS NOTE DETAILS
PA BASEIL: Pt resting comfortably, NAD. VSS. No obvious swelling; managing airway and secretions appropriately. Will continue to monitor and observe. Patient states she is feeling better. Swelling and shortness of breath have improved. Exam: VS stable  heart- RRR s1s2 nl lungs - clear bilaterally abd - soft NT ND extrem - no edema or cyanosis Had received steroid, benadryl, pepcid in ER and doing well.  Plan to discharge home today if patient's condition remains stable. Continue to monitor. Patient states she is feeling better. Swelling and shortness of breath have improved. Exam: VS stable  heart- RRR s1s2 nl lungs - clear bilaterally abd - soft NT ND extrem - no edema or cyanosis Had received steroid, benadryl,  in ER and doing well.  Plan to discharge home today if patient's condition remains stable. Continue to monitor.

## 2018-10-20 NOTE — ED CDU PROVIDER DISPOSITION NOTE - CLINICAL COURSE
62yo F presented to ED co tongue swelling and shortness of breath after eating a piece of pecan pie and cheese cake. Unclear if that caused allergic reaction. Pt denies any new meds/lotions/ detergents. Unclear etiology of the cause. Pt had labs with allergist in the past.  Overnight pt feeling better. Denies any sob/cp/no change in voice. no rash not itchy   vss mmm. no drooling. midline uvula. No resp distress. able to speak in full and clear sentences. no wheeze, rales or stridor. normal S1-S2 no rash. no hives.  plan dc home w prednisone, benadryl prn pepcid. outpt allergy follow up. pt would like to go home. will have  pick her up.

## 2018-10-20 NOTE — ED CDU PROVIDER DISPOSITION NOTE - PLAN OF CARE
Resolution of symptoms Follow up with your PMD within 2-3 days. Also follow up with allergist Dr GEOVANNI Raza within 2-3 days. Call for appointment. Bring copies of your ER lab reports with you to MD appointment. Take pepcid 20mg twice daily. Take prednisone 50 mg once daily until finished. Take Benadryl 25mg every 6 hours as needed for itch. This medication may cause drowsiness so driving ir operating equipment is not recommended. Return to ER if symptoms return or worsen or if new concerns arise.

## 2021-02-25 NOTE — ED ADULT NURSE NOTE - MODE OF DISCHARGE
----- Message from Alexander Adelinemarbella sent at 2/24/2021  1:03 PM EST -----  Subject: Appointment Request    Reason for Call: Routine Follow Up    QUESTIONS  Type of Appointment? Established Patient  Reason for appointment request? Other - Appt can not be booked wt the ecc   send message to the provider  Additional Information for Provider?   ---------------------------------------------------------------------------  --------------  CALL BACK INFO  What is the best way for the office to contact you? OK to leave message on   voicemail  Preferred Call Back Phone Number? [phone]  ---------------------------------------------------------------------------  --------------  SCRIPT ANSWERS  Relationship to Patient? Parent  Representative Name? Margie(mother)  Additional information verified (besides Name and Date of Birth)? Address  Appointment reason? Well Care/Follow Ups  Select a Well Care/Follow Ups appointment reason? Child Follow Up  Does the child have a fever greater than 100.4 or feel hot to touch? No  Is the child sick or ill? No  Does the child have any pain? No  Are the patient's symptoms worsening or showing no improvement? No  (Is the patient/parent requesting to be seen urgently for their   symptoms?)? No  Is there an issue with the medication previously provided? No  Were you instructed to schedule a follow up by a medical professional? Yes  Have you been diagnosed with   tested for   or told that you are suspected of having COVID-19 (Coronavirus)? No  Have you had a fever or taken medication to treat a fever within the past   3 days? No  Have you had a cough   shortness of breath or flu-like symptoms within the past 3 days? No  Do you currently have flu-like symptoms including fever or chills   cough   shortness of breath   or difficulty breathing   or new loss of taste or smell? No  (Service Expert  click yes below to proceed with [employer] As Usual   Scheduling)?  Yes
LVM TO CALL US BACK AND SCHEDULE
LVM to call back
Mom returned call and patient has been scheduled.
Ambulatory

## 2022-02-16 NOTE — ED CDU PROVIDER DISPOSITION NOTE - CARE PROVIDERS DIRECT ADDRESSES
[email or web address] Colchicine Counseling:  Patient counseled regarding adverse effects including but not limited to stomach upset (nausea, vomiting, stomach pain, or diarrhea).  Patient instructed to limit alcohol consumption while taking this medication.  Colchicine may reduce blood counts especially with prolonged use.  The patient understands that monitoring of kidney function and blood counts may be required, especially at baseline. The patient verbalized understanding of the proper use and possible adverse effects of colchicine.  All of the patient's questions and concerns were addressed.
